# Patient Record
Sex: MALE | Race: BLACK OR AFRICAN AMERICAN | NOT HISPANIC OR LATINO | URBAN - METROPOLITAN AREA
[De-identification: names, ages, dates, MRNs, and addresses within clinical notes are randomized per-mention and may not be internally consistent; named-entity substitution may affect disease eponyms.]

---

## 2017-10-27 ENCOUNTER — OUTPATIENT (OUTPATIENT)
Dept: INPATIENT UNIT | Facility: HOSPITAL | Age: 67
LOS: 1 days | End: 2017-10-27
Payer: MEDICARE

## 2017-10-27 VITALS
DIASTOLIC BLOOD PRESSURE: 88 MMHG | HEIGHT: 70 IN | OXYGEN SATURATION: 98 % | TEMPERATURE: 100 F | HEART RATE: 83 BPM | WEIGHT: 229.94 LBS | RESPIRATION RATE: 16 BRPM | SYSTOLIC BLOOD PRESSURE: 158 MMHG

## 2017-10-27 DIAGNOSIS — Z95.5 PRESENCE OF CORONARY ANGIOPLASTY IMPLANT AND GRAFT: Chronic | ICD-10-CM

## 2017-10-27 DIAGNOSIS — I25.10 ATHEROSCLEROTIC HEART DISEASE OF NATIVE CORONARY ARTERY WITHOUT ANGINA PECTORIS: ICD-10-CM

## 2017-10-27 LAB
ALBUMIN SERPL ELPH-MCNC: 3.7 G/DL — SIGNIFICANT CHANGE UP (ref 3.3–5)
ALP SERPL-CCNC: 82 U/L — SIGNIFICANT CHANGE UP (ref 40–120)
ALT FLD-CCNC: 34 U/L RC — SIGNIFICANT CHANGE UP (ref 10–45)
ANION GAP SERPL CALC-SCNC: 12 MMOL/L — SIGNIFICANT CHANGE UP (ref 5–17)
AST SERPL-CCNC: 28 U/L — SIGNIFICANT CHANGE UP (ref 10–40)
BILIRUB SERPL-MCNC: 0.6 MG/DL — SIGNIFICANT CHANGE UP (ref 0.2–1.2)
BUN SERPL-MCNC: 13 MG/DL — SIGNIFICANT CHANGE UP (ref 7–23)
CALCIUM SERPL-MCNC: 8.8 MG/DL — SIGNIFICANT CHANGE UP (ref 8.4–10.5)
CHLORIDE SERPL-SCNC: 103 MMOL/L — SIGNIFICANT CHANGE UP (ref 96–108)
CO2 SERPL-SCNC: 24 MMOL/L — SIGNIFICANT CHANGE UP (ref 22–31)
CREAT SERPL-MCNC: 1.03 MG/DL — SIGNIFICANT CHANGE UP (ref 0.5–1.3)
GLUCOSE SERPL-MCNC: 103 MG/DL — HIGH (ref 70–99)
HCT VFR BLD CALC: 36.8 % — LOW (ref 39–50)
HGB BLD-MCNC: 12.6 G/DL — LOW (ref 13–17)
MCHC RBC-ENTMCNC: 27 PG — SIGNIFICANT CHANGE UP (ref 27–34)
MCHC RBC-ENTMCNC: 34.3 GM/DL — SIGNIFICANT CHANGE UP (ref 32–36)
MCV RBC AUTO: 78.7 FL — LOW (ref 80–100)
PLATELET # BLD AUTO: 218 K/UL — SIGNIFICANT CHANGE UP (ref 150–400)
POTASSIUM SERPL-MCNC: 4.1 MMOL/L — SIGNIFICANT CHANGE UP (ref 3.5–5.3)
POTASSIUM SERPL-SCNC: 4.1 MMOL/L — SIGNIFICANT CHANGE UP (ref 3.5–5.3)
PROT SERPL-MCNC: 7 G/DL — SIGNIFICANT CHANGE UP (ref 6–8.3)
RBC # BLD: 4.68 M/UL — SIGNIFICANT CHANGE UP (ref 4.2–5.8)
RBC # FLD: 15.4 % — HIGH (ref 10.3–14.5)
SODIUM SERPL-SCNC: 139 MMOL/L — SIGNIFICANT CHANGE UP (ref 135–145)
WBC # BLD: 12.1 K/UL — HIGH (ref 3.8–10.5)
WBC # FLD AUTO: 12.1 K/UL — HIGH (ref 3.8–10.5)

## 2017-10-27 PROCEDURE — 93010 ELECTROCARDIOGRAM REPORT: CPT

## 2017-10-27 RX ORDER — ASPIRIN/CALCIUM CARB/MAGNESIUM 324 MG
81 TABLET ORAL DAILY
Qty: 0 | Refills: 0 | Status: DISCONTINUED | OUTPATIENT
Start: 2017-10-28 | End: 2017-10-28

## 2017-10-27 RX ORDER — ISOSORBIDE MONONITRATE 60 MG/1
60 TABLET, EXTENDED RELEASE ORAL DAILY
Qty: 0 | Refills: 0 | Status: DISCONTINUED | OUTPATIENT
Start: 2017-10-27 | End: 2017-10-28

## 2017-10-27 RX ORDER — METOPROLOL TARTRATE 50 MG
100 TABLET ORAL DAILY
Qty: 0 | Refills: 0 | Status: DISCONTINUED | OUTPATIENT
Start: 2017-10-27 | End: 2017-10-28

## 2017-10-27 RX ORDER — ATORVASTATIN CALCIUM 80 MG/1
80 TABLET, FILM COATED ORAL AT BEDTIME
Qty: 0 | Refills: 0 | Status: DISCONTINUED | OUTPATIENT
Start: 2017-10-27 | End: 2017-10-28

## 2017-10-27 RX ORDER — LISINOPRIL 2.5 MG/1
20 TABLET ORAL DAILY
Qty: 0 | Refills: 0 | Status: DISCONTINUED | OUTPATIENT
Start: 2017-10-27 | End: 2017-10-28

## 2017-10-27 RX ORDER — CLOPIDOGREL BISULFATE 75 MG/1
75 TABLET, FILM COATED ORAL DAILY
Qty: 0 | Refills: 0 | Status: DISCONTINUED | OUTPATIENT
Start: 2017-10-28 | End: 2017-10-28

## 2017-10-27 RX ORDER — MONTELUKAST 4 MG/1
10 TABLET, CHEWABLE ORAL DAILY
Qty: 0 | Refills: 0 | Status: DISCONTINUED | OUTPATIENT
Start: 2017-10-27 | End: 2017-10-28

## 2017-10-27 RX ORDER — PANTOPRAZOLE SODIUM 20 MG/1
40 TABLET, DELAYED RELEASE ORAL
Qty: 0 | Refills: 0 | Status: DISCONTINUED | OUTPATIENT
Start: 2017-10-27 | End: 2017-10-28

## 2017-10-27 RX ADMIN — ATORVASTATIN CALCIUM 80 MILLIGRAM(S): 80 TABLET, FILM COATED ORAL at 21:58

## 2017-10-27 RX ADMIN — MONTELUKAST 10 MILLIGRAM(S): 4 TABLET, CHEWABLE ORAL at 22:55

## 2017-10-28 VITALS
SYSTOLIC BLOOD PRESSURE: 160 MMHG | OXYGEN SATURATION: 98 % | TEMPERATURE: 98 F | RESPIRATION RATE: 17 BRPM | HEART RATE: 79 BPM | DIASTOLIC BLOOD PRESSURE: 79 MMHG

## 2017-10-28 LAB
ANION GAP SERPL CALC-SCNC: 14 MMOL/L — SIGNIFICANT CHANGE UP (ref 5–17)
BASOPHILS # BLD AUTO: 0 K/UL — SIGNIFICANT CHANGE UP (ref 0–0.2)
BASOPHILS NFR BLD AUTO: 0.2 % — SIGNIFICANT CHANGE UP (ref 0–2)
BUN SERPL-MCNC: 15 MG/DL — SIGNIFICANT CHANGE UP (ref 7–23)
CALCIUM SERPL-MCNC: 9 MG/DL — SIGNIFICANT CHANGE UP (ref 8.4–10.5)
CHLORIDE SERPL-SCNC: 101 MMOL/L — SIGNIFICANT CHANGE UP (ref 96–108)
CO2 SERPL-SCNC: 25 MMOL/L — SIGNIFICANT CHANGE UP (ref 22–31)
CREAT SERPL-MCNC: 1.34 MG/DL — HIGH (ref 0.5–1.3)
EOSINOPHIL # BLD AUTO: 0.4 K/UL — SIGNIFICANT CHANGE UP (ref 0–0.5)
EOSINOPHIL NFR BLD AUTO: 2.9 % — SIGNIFICANT CHANGE UP (ref 0–6)
GLUCOSE SERPL-MCNC: 120 MG/DL — HIGH (ref 70–99)
HCT VFR BLD CALC: 36.2 % — LOW (ref 39–50)
HGB BLD-MCNC: 12.7 G/DL — LOW (ref 13–17)
LYMPHOCYTES # BLD AUTO: 1.7 K/UL — SIGNIFICANT CHANGE UP (ref 1–3.3)
LYMPHOCYTES # BLD AUTO: 13.8 % — SIGNIFICANT CHANGE UP (ref 13–44)
MCHC RBC-ENTMCNC: 27.4 PG — SIGNIFICANT CHANGE UP (ref 27–34)
MCHC RBC-ENTMCNC: 35 GM/DL — SIGNIFICANT CHANGE UP (ref 32–36)
MCV RBC AUTO: 78.2 FL — LOW (ref 80–100)
MONOCYTES # BLD AUTO: 1.3 K/UL — HIGH (ref 0–0.9)
MONOCYTES NFR BLD AUTO: 10.2 % — SIGNIFICANT CHANGE UP (ref 2–14)
NEUTROPHILS # BLD AUTO: 9 K/UL — HIGH (ref 1.8–7.4)
NEUTROPHILS NFR BLD AUTO: 72.8 % — SIGNIFICANT CHANGE UP (ref 43–77)
PLATELET # BLD AUTO: 223 K/UL — SIGNIFICANT CHANGE UP (ref 150–400)
POTASSIUM SERPL-MCNC: 3.8 MMOL/L — SIGNIFICANT CHANGE UP (ref 3.5–5.3)
POTASSIUM SERPL-SCNC: 3.8 MMOL/L — SIGNIFICANT CHANGE UP (ref 3.5–5.3)
RBC # BLD: 4.62 M/UL — SIGNIFICANT CHANGE UP (ref 4.2–5.8)
RBC # FLD: 15.6 % — HIGH (ref 10.3–14.5)
SODIUM SERPL-SCNC: 140 MMOL/L — SIGNIFICANT CHANGE UP (ref 135–145)
WBC # BLD: 12.3 K/UL — HIGH (ref 3.8–10.5)
WBC # FLD AUTO: 12.3 K/UL — HIGH (ref 3.8–10.5)

## 2017-10-28 PROCEDURE — 93005 ELECTROCARDIOGRAM TRACING: CPT

## 2017-10-28 PROCEDURE — 93010 ELECTROCARDIOGRAM REPORT: CPT

## 2017-10-28 PROCEDURE — 80048 BASIC METABOLIC PNL TOTAL CA: CPT

## 2017-10-28 PROCEDURE — C1769: CPT

## 2017-10-28 PROCEDURE — C9600: CPT | Mod: LD

## 2017-10-28 PROCEDURE — 80053 COMPREHEN METABOLIC PANEL: CPT

## 2017-10-28 PROCEDURE — 99153 MOD SED SAME PHYS/QHP EA: CPT

## 2017-10-28 PROCEDURE — C1894: CPT

## 2017-10-28 PROCEDURE — 99152 MOD SED SAME PHYS/QHP 5/>YRS: CPT

## 2017-10-28 PROCEDURE — C1725: CPT

## 2017-10-28 PROCEDURE — 85027 COMPLETE CBC AUTOMATED: CPT

## 2017-10-28 PROCEDURE — C1887: CPT

## 2017-10-28 PROCEDURE — C1874: CPT

## 2017-10-28 RX ORDER — PANTOPRAZOLE SODIUM 20 MG/1
1 TABLET, DELAYED RELEASE ORAL
Qty: 0 | Refills: 0 | COMMUNITY
Start: 2017-10-28

## 2017-10-28 RX ORDER — ASPIRIN/CALCIUM CARB/MAGNESIUM 324 MG
1 TABLET ORAL
Qty: 30 | Refills: 11 | OUTPATIENT
Start: 2017-10-28 | End: 2018-10-22

## 2017-10-28 RX ORDER — CLOPIDOGREL BISULFATE 75 MG/1
1 TABLET, FILM COATED ORAL
Qty: 30 | Refills: 11 | OUTPATIENT
Start: 2017-10-28 | End: 2018-10-22

## 2017-10-28 RX ADMIN — PANTOPRAZOLE SODIUM 40 MILLIGRAM(S): 20 TABLET, DELAYED RELEASE ORAL at 06:18

## 2017-10-28 RX ADMIN — LISINOPRIL 20 MILLIGRAM(S): 2.5 TABLET ORAL at 06:19

## 2017-10-28 RX ADMIN — Medication 81 MILLIGRAM(S): at 06:19

## 2017-10-28 RX ADMIN — Medication 100 MILLIGRAM(S): at 06:19

## 2017-10-28 RX ADMIN — CLOPIDOGREL BISULFATE 75 MILLIGRAM(S): 75 TABLET, FILM COATED ORAL at 06:18

## 2017-10-28 NOTE — DISCHARGE NOTE ADULT - MEDICATION SUMMARY - MEDICATIONS TO TAKE
I will START or STAY ON the medications listed below when I get home from the hospital:    Ecotrin Adult Low Strength 81 mg oral delayed release tablet  -- 1 tab(s) by mouth once a day, home/ hosp  -- Indication: For To keep stent patent     lisinopril 20 mg oral tablet  -- 1 tab(s) by mouth once a day, home  -- Indication: For Hypertension     Imdur 60 mg oral tablet, extended release  -- 1 tab(s) by mouth once a day (in the morning), home/hosp  -- Indication: For Hypertension     isosorbide mononitrate 60 mg oral tablet, extended release  -- 1 tab(s) by mouth once a day (in the morning), home  -- Indication: For Hypertension     pravastatin 80 mg oral tablet  -- 1 tab(s) by mouth once a day, home  -- Indication: For Hypertension     clopidogrel 75 mg oral tablet  -- 1 tab(s) by mouth once a day  -- Indication: For To keep stent patent     Toprol- mg oral tablet, extended release  -- 1 tab(s) by mouth once a day, home/ hosp  -- Indication: For Hypertension     montelukast 10 mg oral tablet  -- 1 tab(s) by mouth once a day, home  -- Indication: For Asthma     pantoprazole 40 mg oral delayed release tablet  -- 1 tab(s) by mouth once a day (before a meal)  -- Indication: For GERD I will START or STAY ON the medications listed below when I get home from the hospital:    Ecotrin Adult Low Strength 81 mg oral delayed release tablet  -- 1 tab(s) by mouth once a day, home/ hosp  -- Indication: For To keep stent patent     lisinopril 20 mg oral tablet  -- 1 tab(s) by mouth once a day, home  -- Indication: For Hypertension     Imdur 60 mg oral tablet, extended release  -- 1 tab(s) by mouth once a day (in the morning), home/hosp  -- Indication: For Hypertension     isosorbide mononitrate 60 mg oral tablet, extended release  -- 1 tab(s) by mouth once a day (in the morning), home  -- Indication: For Hypertension     pravastatin 80 mg oral tablet  -- 1 tab(s) by mouth once a day, home  -- Indication: For Hypertension     clopidogrel 75 mg oral tablet  -- 1 tab(s) by mouth once a day  -- Indication: For To keep stent paatent     Toprol- mg oral tablet, extended release  -- 1 tab(s) by mouth once a day, home/ hosp  -- Indication: For Hypertension     montelukast 10 mg oral tablet  -- 1 tab(s) by mouth once a day, home  -- Indication: For Asthma     pantoprazole 40 mg oral delayed release tablet  -- 1 tab(s) by mouth once a day (before a meal)  -- Indication: For GERD

## 2017-10-28 NOTE — DISCHARGE NOTE ADULT - CARE PLAN
Principal Discharge DX:	CAD (coronary artery disease)  Goal:	Pt remains chest pain free and understands post cath discharge instructions  Instructions for follow-up, activity and diet:	No heavy lifting or pushing/pulling with procedure arm for 2 weeks. No driving for 2 days. You may shower 24 hours following the procedure but avoid baths/swimming for 1 week. Check your wrist site for bleeding and/or swelling daily following procedure and call your doctor immediately if it occurs or if you experience increased pain at the site. Follow up with your cardiologist in 1-2 weeks. You may call [facility] if you have any questions/concerns regarding your procedure [phone].  Secondary Diagnosis:	HLD (hyperlipidemia)  Goal:	Your LDL cholesterol will be less than 70mg/dL  Instructions for follow-up, activity and diet:	Continue with your cholesterol medications. Eat a heart healthy diet that is low in saturated fats and salt, and includes whole grains, fruits, vegetables and lean protein; exercise regularly (consult with your physician or cardiologist first); maintain a heart healthy weight. Continue to follow with your primary physician or cardiologist for treatment goals, continue medication, have liver function testing every 3 months as anti lipid medications can cause liver irritation. If you smoke - quit (A resource to help you stop smoking is the [facility] for Tobacco Control – phone number [phone].).  Secondary Diagnosis:	HTN (hypertension)  Goal:	Your blood pressure will be controlled.  Instructions for follow-up, activity and diet:	Continue with your blood pressure medications; eat a heart healthy diet with low salt diet; exercise regularly (consult with your physician or cardiologist first); maintain a heart healthy weight; if you smoke - quit (A resource to help you stop smoking is the [facility] for Tobacco Control – phone number [phone].); include healthy ways to manage stress. Continue to follow with your primary care physician or cardiologist.

## 2017-10-28 NOTE — DISCHARGE NOTE ADULT - HOSPITAL COURSE
66 y/o male with PMHx of CAD, s/p stents in 2007, HTN, HLD presented to [facility] today with c/o chest pressure and had coronary angiogram done today ( 10/27) Patient states he has on and off chest pain for over a month with exertion which relives at rest. Patient was referred for angiogram  and was done by Dr. Martinez,  which reveals  90% stenosis in LAD.  Patient is transferred to [facility] for PCI to LAD. Pt is now s/p cardiac cath MARITZA x 1 LAD via right radial. Pt tolerated the procedure well. Cardiac cath site benign. Post-procedure discharge instructions discussed and questions addressed.

## 2017-10-28 NOTE — PROGRESS NOTE ADULT - SUBJECTIVE AND OBJECTIVE BOX
67y old  Male who presents with a chief complaint of Chest pain (28 Oct 2017 05:45) now s/p cardiac cath MARITZA x 1 LAD       Allergies    Honey (Vomiting)  No Known Drug Allergies    Intolerances      Medications:  aspirin enteric coated 81 milliGRAM(s) Oral daily  atorvastatin 80 milliGRAM(s) Oral at bedtime  clopidogrel Tablet 75 milliGRAM(s) Oral daily  isosorbide   mononitrate ER Tablet (IMDUR) 60 milliGRAM(s) Oral daily  lisinopril 20 milliGRAM(s) Oral daily  metoprolol succinate  milliGRAM(s) Oral daily  montelukast 10 milliGRAM(s) Oral daily  pantoprazole    Tablet 40 milliGRAM(s) Oral before breakfast      Vitals:  T(C): 36.4 (10-27-17 @ 17:30), Max: 37.6 (10-27-17 @ 12:08)  HR: 81 (10-27-17 @ 18:30) (74 - 83)  BP: 172/91 (10-27-17 @ 18:30) (158/88 - 172/91)  BP(mean): 111 (10-27-17 @ 12:08) (111 - 111)  RR: 16 (10-27-17 @ 18:30) (16 - 16)  SpO2: 98% (10-27-17 @ 18:30) (98% - 98%)  Wt(kg): --  Daily Height in cm: 177.8 (27 Oct 2017 17:30)    Daily Weight in k.3 (27 Oct 2017 17:30)  I&O's Summary    27 Oct 2017 07:01  -  28 Oct 2017 06:22  --------------------------------------------------------  IN: 480 mL / OUT: 400 mL / NET: 80 mL          Physical Exam:  Appearance: Normal  Eyes: PERRL, EOMI  Cardiovascular: S1S2, RRR, No M/R/G, no JVD, No Lower extremity edema  Procedural Access Site: Right radial. No hematoma, Non-tender to palpation, 2+ pulse, No bruit, No Ecchymosis  Respiratory: Clear to auscultation bilaterally  Gastrointestinal: Soft, Non tender, Normal Bowel Sounds  Musculoskeletal: No clubbing, No joint deformity   Neurologic: Non-focal  Psychiatry: AAOx3, Mood & affect appropriate  Skin: No rashes, No ecchymoses, No cyanosis    10-28    140  |  101  |  15  ----------------------------<  120<H>  3.8   |  25  |  1.34<H>    Ca    9.0      28 Oct 2017 01:23    TPro  7.0  /  Alb  3.7  /  TBili  0.6  /  DBili  x   /  AST  28  /  ALT  34  /  AlkPhos  82  10-27    ECG: NSR 81bpm   Cath:< from: Cardiac Cath Lab - Adult (10.27.17 @ 13:09) >  [facility]  Department of Cardiology  [address] [place] [address]  [phone]  Cath Lab Report -- Comprehensive Report  Patient: JASON CHEN  Study date: 10/27/2017  Account number: [id number]  MR number: [id number]  : 1950  Gender: Male  Race: Unknown  Case Physician(s):  Randall Martinez MD  Fellow:  Jc Lane M.D.  Referring Physician:  Hermann Moran MD  INDICATIONS: Unstable angina - CCS3.  HISTORY: 66 yo M with PMH of HTN, CAD s/p PCI in  electively cath at  [facility] for exertional chest pain on optimal anti-anginal therapy.  Cath at [facility] revealed 95% mid RCA, 80% OM and 30% instent restenosis of the  RCA stents. Normal EF.  Transferred for elective PCI of the LAD Known timing ofprior myocardial  infarction was eight or more days prior to admission. The patient has  hypertension and medication-treated dyslipidemia.  PROCEDURE:  --  Intervention on mid LAD: drug-eluting stent.  TECHNIQUE: The patient was brought to the cath lab and placed on the table.  The planned puncture sites were prepped and draped in the usual sterile  fashion. The risks and alternatives of the procedures and conscious  sedation were explained to the patient and informed consent was obtained.  Cardiac catheterization performed electively. Coronary intervention  performed electively.  Local anesthetic given. Right radial artery access. Right radial artery  access. A 6FR PRELUDE KIT was inserted in the vessel. RADIATION EXPOSURE:  20.4 min. A drug-eluting stent was performed on the 90 % lesion in the mid  LAD. Following intervention there was a 1 % residual stenosis. Vessel  setup was performed. A 6FR JL3.5 LAUNCHER guiding catheter was used to  intubate the vessel. Vessel setup was performed.A BMW UNIVERSAL 190CM  wire was used to cross the lesion. Balloon angioplasty was performed,  using a 2.5 X 15 MAVERICK RX balloon, with 4 inflations and a maximum  inflation pressure of 14 inna. Balloon angioplasty was performed, using a  3.00 X 15 MAVERICK RX balloon, with 3 inflations and a maximum inflation  pressure of 14 inna. A 4.00 X 24 SYNERGY drug-eluting stent was placed  across the lesion and deployed at a maximum inflation pressure of 11 inna.  Balloon angioplasty was performed, using a4.00 X 12 NC APEX balloon, with  4 inflations and a maximum inflation pressure of 18 inna.  CONTRAST GIVEN: Omnipaque 112 ml.  MEDICATIONS GIVEN: Fentanyl, 25 mcg, IV. Midazolam, 1 mg, IV.  Nitroglycerin, 150 mcg, intracoronary. Nitroglycerin, 150 mcg,  intracoronary. Nitroglycerin, 150 mcg, intracoronary. Heparin, 8000 units,  IV. Cardene, 500 mcg.  VENTRICLES: No left ventriculogram was performed.  CORONARY VESSELS: The coronary circulation is right dominant.  LM:   --  LM: Normal.  LAD:   --  Mid LAD: There was a 90 % stenosis.  CX:   --  OM1: There was a 80 % stenosis.  RCA:   --  RCA: This vessel was not injected, but was visualized during a  prior cardiac catheterization on 10/27/2017.  COMPLICATIONS: There were no complications.  DIAGNOSTIC RECOMMENDATIONS: Successful PCI of the mid LAD with MARITZA x1  (synergy 4.0 x 24 post dilated to 4.2).  A/P  -aspirin 81 mg daily indefinitely  -plavix 75 mg daily for at least one year  -lipitor 80 mg daily  -aggressive BP control  -plan for PCI ofOM in 1 month  INTERVENTIONAL RECOMMENDATIONS: Successful PCI of the mid LAD with MARITZA x1  (synergy 4.0 x 24 post dilated to 4.2).  A/P  -aspirin 81 mg daily indefinitely  -plavix 75 mg daily for at least one year  -lipitor 80 mg daily  -aggressive BPcontrol  -plan for PCI of OM in 1 month  Prepared and signed by  Randall Martinez MD  Signed 10/27/2017 14:44:38  HEMODYNAMIC TABLES  Pressures:  Intervention  Pressures:  - HR: 70  Pressures:  - Rhythm:  Pressures:  -- Aortic Pressure (S/D/M): 176/105/135  Outputs:  Baseline  Outputs:  -- CALCULATIONS[age]  Outputs:  -- CALCULATIONS: Body Surface Area: 2.22  Outputs:  -- CALCULATIONS: Height in cm: 178.00  Outputs:  -- CALCULATIONS: Sex: Male  Outputs:  -- CALCULATIONS: Weight ink.30    < end of copied text >      Interpretation of Telemetry: SR 80s

## 2017-10-28 NOTE — DISCHARGE NOTE ADULT - PATIENT PORTAL LINK FT
“You can access the FollowHealth Patient Portal, offered by [facility], by registering with the following website: [email or web address]”

## 2017-10-28 NOTE — DISCHARGE NOTE ADULT - PLAN OF CARE
Pt remains chest pain free and understands post cath discharge instructions No heavy lifting or pushing/pulling with procedure arm for 2 weeks. No driving for 2 days. You may shower 24 hours following the procedure but avoid baths/swimming for 1 week. Check your wrist site for bleeding and/or swelling daily following procedure and call your doctor immediately if it occurs or if you experience increased pain at the site. Follow up with your cardiologist in 1-2 weeks. You may call [facility] if you have any questions/concerns regarding your procedure [phone]. Your LDL cholesterol will be less than 70mg/dL Continue with your cholesterol medications. Eat a heart healthy diet that is low in saturated fats and salt, and includes whole grains, fruits, vegetables and lean protein; exercise regularly (consult with your physician or cardiologist first); maintain a heart healthy weight. Continue to follow with your primary physician or cardiologist for treatment goals, continue medication, have liver function testing every 3 months as anti lipid medications can cause liver irritation. If you smoke - quit (A resource to help you stop smoking is the [facility] for Tobacco Control – phone number [phone].). Your blood pressure will be controlled. Continue with your blood pressure medications; eat a heart healthy diet with low salt diet; exercise regularly (consult with your physician or cardiologist first); maintain a heart healthy weight; if you smoke - quit (A resource to help you stop smoking is the [facility] for Tobacco Control – phone number [phone].); include healthy ways to manage stress. Continue to follow with your primary care physician or cardiologist.

## 2017-10-28 NOTE — DISCHARGE NOTE ADULT - ADDITIONAL INSTRUCTIONS
Follow-up with your cardiologist in 1-2 weeks.  DO NOT STOP ASPIRIN AND PLAVIX Follow-up with your cardiologist in 1-2 weeks.  DO NOT STOP ASPIRIN AND PLAVIX BEFORE SPEAKING WITH YOUR CARDIOLOGIST

## 2017-10-28 NOTE — DISCHARGE NOTE ADULT - CARE PROVIDER_API CALL
Hermann Moran), Cardiology; Internal Medicine  [address] [place] [address]  Phone: [phone]  Fax: [phone]

## 2017-10-31 RX ORDER — OMEPRAZOLE 10 MG/1
1 CAPSULE, DELAYED RELEASE ORAL
Qty: 0 | Refills: 0 | COMMUNITY

## 2017-10-31 RX ORDER — ASPIRIN/CALCIUM CARB/MAGNESIUM 324 MG
1 TABLET ORAL
Qty: 0 | Refills: 0 | COMMUNITY

## 2017-10-31 RX ORDER — LISINOPRIL 2.5 MG/1
1 TABLET ORAL
Qty: 0 | Refills: 0 | COMMUNITY

## 2017-11-29 ENCOUNTER — OUTPATIENT (OUTPATIENT)
Dept: OUTPATIENT SERVICES | Facility: HOSPITAL | Age: 67
LOS: 1 days | End: 2017-11-29
Payer: MEDICARE

## 2017-11-29 VITALS
SYSTOLIC BLOOD PRESSURE: 138 MMHG | TEMPERATURE: 98 F | DIASTOLIC BLOOD PRESSURE: 80 MMHG | HEART RATE: 78 BPM | HEIGHT: 70 IN | OXYGEN SATURATION: 99 % | RESPIRATION RATE: 16 BRPM | WEIGHT: 229.94 LBS

## 2017-11-29 DIAGNOSIS — Z95.5 PRESENCE OF CORONARY ANGIOPLASTY IMPLANT AND GRAFT: Chronic | ICD-10-CM

## 2017-11-29 DIAGNOSIS — Z95.9 PRESENCE OF CARDIAC AND VASCULAR IMPLANT AND GRAFT, UNSPECIFIED: Chronic | ICD-10-CM

## 2017-11-29 DIAGNOSIS — I25.10 ATHEROSCLEROTIC HEART DISEASE OF NATIVE CORONARY ARTERY WITHOUT ANGINA PECTORIS: ICD-10-CM

## 2017-11-29 LAB
ALBUMIN SERPL ELPH-MCNC: 4.4 G/DL — SIGNIFICANT CHANGE UP (ref 3.3–5)
ALP SERPL-CCNC: 109 U/L — SIGNIFICANT CHANGE UP (ref 40–120)
ALT FLD-CCNC: 37 U/L RC — SIGNIFICANT CHANGE UP (ref 10–45)
ANION GAP SERPL CALC-SCNC: 11 MMOL/L — SIGNIFICANT CHANGE UP (ref 5–17)
AST SERPL-CCNC: 28 U/L — SIGNIFICANT CHANGE UP (ref 10–40)
BILIRUB SERPL-MCNC: 0.6 MG/DL — SIGNIFICANT CHANGE UP (ref 0.2–1.2)
BUN SERPL-MCNC: 13 MG/DL — SIGNIFICANT CHANGE UP (ref 7–23)
CALCIUM SERPL-MCNC: 9.5 MG/DL — SIGNIFICANT CHANGE UP (ref 8.4–10.5)
CHLORIDE SERPL-SCNC: 104 MMOL/L — SIGNIFICANT CHANGE UP (ref 96–108)
CO2 SERPL-SCNC: 27 MMOL/L — SIGNIFICANT CHANGE UP (ref 22–31)
CREAT SERPL-MCNC: 1.1 MG/DL — SIGNIFICANT CHANGE UP (ref 0.5–1.3)
GLUCOSE SERPL-MCNC: 114 MG/DL — HIGH (ref 70–99)
HCT VFR BLD CALC: 38.4 % — LOW (ref 39–50)
HGB BLD-MCNC: 12.8 G/DL — LOW (ref 13–17)
MCHC RBC-ENTMCNC: 26.6 PG — LOW (ref 27–34)
MCHC RBC-ENTMCNC: 33.4 GM/DL — SIGNIFICANT CHANGE UP (ref 32–36)
MCV RBC AUTO: 79.7 FL — LOW (ref 80–100)
PLATELET # BLD AUTO: 300 K/UL — SIGNIFICANT CHANGE UP (ref 150–400)
POTASSIUM SERPL-MCNC: 4.2 MMOL/L — SIGNIFICANT CHANGE UP (ref 3.5–5.3)
POTASSIUM SERPL-SCNC: 4.2 MMOL/L — SIGNIFICANT CHANGE UP (ref 3.5–5.3)
PROT SERPL-MCNC: 8.1 G/DL — SIGNIFICANT CHANGE UP (ref 6–8.3)
RBC # BLD: 4.82 M/UL — SIGNIFICANT CHANGE UP (ref 4.2–5.8)
RBC # FLD: 15.6 % — HIGH (ref 10.3–14.5)
SODIUM SERPL-SCNC: 142 MMOL/L — SIGNIFICANT CHANGE UP (ref 135–145)
WBC # BLD: 11.4 K/UL — HIGH (ref 3.8–10.5)
WBC # FLD AUTO: 11.4 K/UL — HIGH (ref 3.8–10.5)

## 2017-11-29 PROCEDURE — 93010 ELECTROCARDIOGRAM REPORT: CPT | Mod: 76

## 2017-11-29 RX ORDER — METOPROLOL TARTRATE 50 MG
100 TABLET ORAL DAILY
Qty: 0 | Refills: 0 | Status: DISCONTINUED | OUTPATIENT
Start: 2017-11-29 | End: 2017-11-30

## 2017-11-29 RX ORDER — PREGABALIN 225 MG/1
1 CAPSULE ORAL
Qty: 0 | Refills: 0 | COMMUNITY
Start: 2017-11-29

## 2017-11-29 RX ORDER — NICOTINE POLACRILEX 2 MG
1 GUM BUCCAL
Qty: 1 | Refills: 2 | OUTPATIENT
Start: 2017-11-29 | End: 2018-02-26

## 2017-11-29 RX ORDER — FOLIC ACID 0.8 MG
1 TABLET ORAL DAILY
Qty: 0 | Refills: 0 | Status: DISCONTINUED | OUTPATIENT
Start: 2017-11-29 | End: 2017-11-30

## 2017-11-29 RX ORDER — THIAMINE MONONITRATE (VIT B1) 100 MG
100 TABLET ORAL DAILY
Qty: 0 | Refills: 0 | Status: DISCONTINUED | OUTPATIENT
Start: 2017-11-29 | End: 2017-11-30

## 2017-11-29 RX ORDER — ISOSORBIDE MONONITRATE 60 MG/1
1 TABLET, EXTENDED RELEASE ORAL
Qty: 0 | Refills: 0 | COMMUNITY

## 2017-11-29 RX ORDER — PANTOPRAZOLE SODIUM 20 MG/1
40 TABLET, DELAYED RELEASE ORAL
Qty: 0 | Refills: 0 | Status: DISCONTINUED | OUTPATIENT
Start: 2017-11-29 | End: 2017-11-30

## 2017-11-29 RX ORDER — SODIUM CHLORIDE 9 MG/ML
1000 INJECTION INTRAMUSCULAR; INTRAVENOUS; SUBCUTANEOUS
Qty: 0 | Refills: 0 | Status: DISCONTINUED | OUTPATIENT
Start: 2017-11-29 | End: 2017-11-30

## 2017-11-29 RX ORDER — ATORVASTATIN CALCIUM 80 MG/1
40 TABLET, FILM COATED ORAL AT BEDTIME
Qty: 0 | Refills: 0 | Status: DISCONTINUED | OUTPATIENT
Start: 2017-11-29 | End: 2017-11-30

## 2017-11-29 RX ORDER — ISOSORBIDE MONONITRATE 60 MG/1
60 TABLET, EXTENDED RELEASE ORAL DAILY
Qty: 0 | Refills: 0 | Status: DISCONTINUED | OUTPATIENT
Start: 2017-11-29 | End: 2017-11-30

## 2017-11-29 RX ORDER — ASPIRIN/CALCIUM CARB/MAGNESIUM 324 MG
81 TABLET ORAL DAILY
Qty: 0 | Refills: 0 | Status: DISCONTINUED | OUTPATIENT
Start: 2017-11-29 | End: 2017-11-30

## 2017-11-29 RX ORDER — MONTELUKAST 4 MG/1
10 TABLET, CHEWABLE ORAL DAILY
Qty: 0 | Refills: 0 | Status: DISCONTINUED | OUTPATIENT
Start: 2017-11-29 | End: 2017-11-30

## 2017-11-29 RX ORDER — FOLIC ACID 0.8 MG
1 TABLET ORAL
Qty: 0 | Refills: 0 | COMMUNITY
Start: 2017-11-29

## 2017-11-29 RX ORDER — NICOTINE POLACRILEX 2 MG
1 GUM BUCCAL DAILY
Qty: 0 | Refills: 0 | Status: DISCONTINUED | OUTPATIENT
Start: 2017-11-29 | End: 2017-11-30

## 2017-11-29 RX ORDER — THIAMINE MONONITRATE (VIT B1) 100 MG
1 TABLET ORAL
Qty: 0 | Refills: 0 | COMMUNITY
Start: 2017-11-29

## 2017-11-29 RX ORDER — CLOPIDOGREL BISULFATE 75 MG/1
75 TABLET, FILM COATED ORAL DAILY
Qty: 0 | Refills: 0 | Status: DISCONTINUED | OUTPATIENT
Start: 2017-11-29 | End: 2017-11-30

## 2017-11-29 RX ORDER — LISINOPRIL 2.5 MG/1
20 TABLET ORAL DAILY
Qty: 0 | Refills: 0 | Status: DISCONTINUED | OUTPATIENT
Start: 2017-11-29 | End: 2017-11-30

## 2017-11-29 RX ORDER — PREGABALIN 225 MG/1
100 CAPSULE ORAL DAILY
Qty: 0 | Refills: 0 | Status: DISCONTINUED | OUTPATIENT
Start: 2017-11-29 | End: 2017-11-30

## 2017-11-29 RX ADMIN — ATORVASTATIN CALCIUM 40 MILLIGRAM(S): 80 TABLET, FILM COATED ORAL at 21:11

## 2017-11-29 NOTE — DISCHARGE NOTE ADULT - PATIENT PORTAL LINK FT
“You can access the FollowHealth Patient Portal, offered by [facility], by registering with the following website: [email or web address]”

## 2017-11-29 NOTE — DISCHARGE NOTE ADULT - CARE PROVIDERS DIRECT ADDRESSES
Problem: Patient Care Overview  Goal: Plan of Care Review  No distress noted       ,DirectAddress_Unknown

## 2017-11-29 NOTE — H&P CARDIOLOGY - FAMILY HISTORY
Mother  Still living? Yes, Estimated age: Age Unknown  Family history of premature CAD, Age at diagnosis: Age Unknown

## 2017-11-29 NOTE — H&P CARDIOLOGY - PSH
S/P angioplasty with stent  10/27/17 s/p PCI/MARITZA x 1 to mLAD 90% with OM1 80% for staged PCI.  S/P drug eluting coronary stent placement

## 2017-11-29 NOTE — DISCHARGE NOTE ADULT - PLAN OF CARE
Pt remains chest pain free and understands post cath discharge instructions Low salt, low fat diet.   Weight management.   Take medications as prescribed.    No smoking.  Follow up appointments with your doctor(s)  as instruced.   No heavy lifting for 2 weeks, no strenuous activity  ( pushing/ pulling) no driving for x 2 days,  you may shower 24 hours following procedure but no bathing or swimming for x1  week, no strenuous sex for x 1 week & follow up with your cardiologist in 1-2 week Your LDL cholesterol will be less than 70mg/dL Continue with your cholesterol medications. Eat a heart healthy diet that is low in saturated fats and salt, and includes whole grains, fruits, vegetables and lean protein; exercise regularly (consult with your physician or cardiologist first); maintain a heart healthy weight; if you smoke - quit (A resource to help you stop smoking is the [facility] for Tobacco Control – phone number [phone].). Continue to follow with your primary physician or cardiologist. Your blood pressure will be controlled. Continue with your blood pressure medications; eat a heart healthy diet with low salt diet; exercise regularly (consult with your physician or cardiologist first); maintain a heart healthy weight; if you smoke - quit (A resource to help you stop smoking is the [facility] for Tobacco Control – phone number [phone].); include healthy ways to manage stress. Continue to follow with your primary care physician or cardiologist.

## 2017-11-29 NOTE — DISCHARGE NOTE ADULT - MEDICATION SUMMARY - MEDICATIONS TO CHANGE
I will SWITCH the dose or number of times a day I take the medications listed below when I get home from the hospital:  None I will SWITCH the dose or number of times a day I take the medications listed below when I get home from the hospital:    atorvastatin 40 mg oral tablet  -- 1 tab(s) by mouth once a day -  (have to check dose Pharmacy was not open)

## 2017-11-29 NOTE — DISCHARGE NOTE ADULT - FINDINGS/TREATMENT
s/p cath on 11/29 via [facility]  < from: Cardiac Cath Lab - Adult (11.29.17 @ 10:51) >  --  Intervention on OM1: drug-eluting stent.    < end of copied text >

## 2017-11-29 NOTE — H&P CARDIOLOGY - NSALCOHOLTYPE_GEN__A_CORE_SD
liquor/1 large 16 0z drink with Vodka/beer lime juice and gingerale and drinks over the course of the day

## 2017-11-29 NOTE — DISCHARGE NOTE ADULT - MEDICATION SUMMARY - MEDICATIONS TO TAKE
I will START or STAY ON the medications listed below when I get home from the hospital:    Ecotrin Adult Low Strength 81 mg oral delayed release tablet  -- 1 tab(s) by mouth once a day, home/ hosp  -- Indication: For cad- stents    lisinopril 20 mg oral tablet  -- 1 tab(s) by mouth once a day, home  -- Indication: For high blood pressure    Imdur 60 mg oral tablet, extended release  -- 1 tab(s) by mouth once a day (in the morning), home/hosp  -- Indication: For chest pain    atorvastatin 40 mg oral tablet  -- 1 tab(s) by mouth once a day -  (have to check dose Pharmacy was not open)  -- Indication: For high cholesterol    clopidogrel 75 mg oral tablet  -- 1 tab(s) by mouth once a day  -- Indication: For cad-stents    Toprol- mg oral tablet, extended release  -- 1 tab(s) by mouth once a day, home/ hosp  -- Indication: For high blood pressure    montelukast 10 mg oral tablet  -- 1 tab(s) by mouth once a day, home  -- Indication: For Asthma    omeprazole 40 mg oral delayed release capsule  -- 1 cap(s) by mouth once a day  -- Indication: For Acid reflux    Nicoderm C-Q 7 mg/24 hr transdermal film, extended release  -- 1 patch by transdermal patch once a day MDD:1  -- Indication: For Smoking cessation     cyanocobalamin 100 mcg oral tablet  -- 1 tab(s) by mouth once a day  -- Indication: For Supplement     folic acid 1 mg oral tablet  -- 1 tab(s) by mouth once a day  -- Indication: For Supplement     thiamine 100 mg oral tablet  -- 1 tab(s) by mouth once a day  -- Indication: For Supplement I will START or STAY ON the medications listed below when I get home from the hospital:    Ecotrin Adult Low Strength 81 mg oral delayed release tablet  -- 1 tab(s) by mouth once a day, home/ hosp  -- Indication: For cad- stents    lisinopril 20 mg oral tablet  -- 1 tab(s) by mouth once a day, home  -- Indication: For high blood pressure    Imdur 60 mg oral tablet, extended release  -- 1 tab(s) by mouth once a day (in the morning), home/hosp  -- Indication: For chest pain    atorvastatin 80 mg oral tablet  -- 1 tab(s) by mouth once a day  -- Indication: For HLD (hyperlipidemia)    clopidogrel 75 mg oral tablet  -- 1 tab(s) by mouth once a day  -- Indication: For cad-stents    Toprol- mg oral tablet, extended release  -- 1 tab(s) by mouth once a day, home/ hosp  -- Indication: For high blood pressure    montelukast 10 mg oral tablet  -- 1 tab(s) by mouth once a day, home  -- Indication: For Asthma    omeprazole 40 mg oral delayed release capsule  -- 1 cap(s) by mouth once a day  -- Indication: For Acid reflux    Nicoderm C-Q 7 mg/24 hr transdermal film, extended release  -- 1 patch by transdermal patch once a day MDD:1  -- Indication: For Smoking cessation     cyanocobalamin 100 mcg oral tablet  -- 1 tab(s) by mouth once a day  -- Indication: For Supplement     folic acid 1 mg oral tablet  -- 1 tab(s) by mouth once a day  -- Indication: For Supplement     thiamine 100 mg oral tablet  -- 1 tab(s) by mouth once a day  -- Indication: For Supplement I will START or STAY ON the medications listed below when I get home from the hospital:    Ecotrin Adult Low Strength 81 mg oral delayed release tablet  -- 1 tab(s) by mouth once a day, home/ hosp  -- Indication: For cad- stents    lisinopril 20 mg oral tablet  -- 1 tab(s) by mouth once a day, home  -- Indication: For high blood pressure    Imdur 60 mg oral tablet, extended release  -- 1 tab(s) by mouth once a day (in the morning), home/hosp  -- Indication: For chest pain    atorvastatin 80 mg oral tablet  -- 1 tab(s) by mouth once a day  -- Indication: For HLD (hyperlipidemia)    clopidogrel 75 mg oral tablet  -- 1 tab(s) by mouth once a day  -- Indication: For cad-stents    Toprol- mg oral tablet, extended release  -- 1 tab(s) by mouth once a day, home/ hosp  -- Indication: For high blood pressure    montelukast 10 mg oral tablet  -- 1 tab(s) by mouth once a day, home  -- Indication: For Asthma    omeprazole 40 mg oral delayed release capsule  -- 1 cap(s) by mouth once a day  -- Indication: For Acid reflux

## 2017-11-29 NOTE — DISCHARGE NOTE ADULT - NS AS ACTIVITY OBS
No Heavy lifting/straining/Walking-Outdoors allowed/Walking-Indoors allowed Showering allowed/Walking-Indoors allowed/Walking-Outdoors allowed/No Heavy lifting/straining

## 2017-11-29 NOTE — DISCHARGE NOTE ADULT - CARE PLAN
Principal Discharge DX:	CAD (coronary artery disease)  Goal:	Pt remains chest pain free and understands post cath discharge instructions  Instructions for follow-up, activity and diet:	Low salt, low fat diet.   Weight management.   Take medications as prescribed.    No smoking.  Follow up appointments with your doctor(s)  as instruced.   No heavy lifting for 2 weeks, no strenuous activity  ( pushing/ pulling) no driving for x 2 days,  you may shower 24 hours following procedure but no bathing or swimming for x1  week, no strenuous sex for x 1 week & follow up with your cardiologist in 1-2 week  Secondary Diagnosis:	HLD (hyperlipidemia)  Goal:	Your LDL cholesterol will be less than 70mg/dL  Instructions for follow-up, activity and diet:	Continue with your cholesterol medications. Eat a heart healthy diet that is low in saturated fats and salt, and includes whole grains, fruits, vegetables and lean protein; exercise regularly (consult with your physician or cardiologist first); maintain a heart healthy weight; if you smoke - quit (A resource to help you stop smoking is the [facility] [employer] Control – phone number [phone].). Continue to follow with your primary physician or cardiologist.  Secondary Diagnosis:	HTN (hypertension)  Goal:	Your blood pressure will be controlled.  Instructions for follow-up, activity and diet:	Continue with your blood pressure medications; eat a heart healthy diet with low salt diet; exercise regularly (consult with your physician or cardiologist first); maintain a heart healthy weight; if you smoke - quit (A resource to help you stop smoking is the [facility] for [employer] Control – phone number [phone].); include healthy ways to manage stress. Continue to follow with your primary care physician or cardiologist.

## 2017-11-29 NOTE — DISCHARGE NOTE ADULT - CARE PROVIDER_API CALL
Randall Martinez), Cardiology; Internal Medicine  [address] [place] [address]  Phone: [phone]  Fax: [phone]

## 2017-11-29 NOTE — DISCHARGE NOTE ADULT - HOSPITAL COURSE
66 y/o obese male former occasional smoker, ETOH dependence (drinks 1 large mixed drink a day last drink 11/28) with pmh of HTN, HLD, CAD with prior stents in 2007 recently tx from [facility] for reports of intermittent chest pain for 1month with exertion relieved at rest s/p PCI/MARITZA to mLAD 90% x 1 followed up with Dr. Martinez, Cardiologist and presents for staged PCI of OM1 80%.  Pt denies cp, sob or palpitations presently and since his PCI on 10/27/17.    < from: Cardiac Cath Lab - Adult (11.29.17 @ 10:51) >  LM:   --  LM: Normal.  LAD:   --  LAD: Angiography showed patent stent and mild atherosclerosis  with no flow limiting lesions distally  CX:   --  OM1: There was a 80 % stenosis.  RCA:   --  RCA: This vessel was not injected.  COMPLICATIONS: There were no complications.  DIAGNOSTIC RECOMMENDATIONS: Successfull PCI of 80% OM 1 stenosis with MARITZA  x1 (Synergy 3.5 x 28 post dilated to 4.0)  A/P  -continue with current medications including aspirin, plavix and statin  -close outpatient follow up  INTERVENTIONAL RECOMMENDATIONS: Successfull PCI of 80% OM 1 stenosis with  MARITZA x1 (Synergy 3.5 x 28 post dilated to 4.0)  A/P  -continue with current medications including aspirin, plavix and statin  -close outpatient follow up  Prepared and signed by  Randall Martinez MD  Signed 11/29/2017 16:15:47    < end of copied text > 68 y/o obese male former occasional smoker, ETOH dependence (drinks 1 large mixed drink a day last drink 11/28) with pmh of HTN, HLD, CAD with prior stents in 2007 recently tx from [facility] for reports of intermittent chest pain for 1month with exertion relieved at rest s/p PCI/MARITZA to mLAD 90% x 1 followed up with Dr. Martinez, Cardiologist and presents for staged PCI of OM1 80%.  Pt denies cp, sob or palpitations presently and since his PCI on 10/27/17.  Pt s/p PCI via R radial. < from: Cardiac Cath Lab - Adult (11.29.17 @ 10:51) >  LM:   --  LM: Normal.  LAD:   --  LAD: Angiography showed patent stent and mild atherosclerosis  with no flow limiting lesions distally  CX:   --  OM1: There was a 80 % stenosis.  RCA:   --  RCA: This vessel was not injected.  COMPLICATIONS: There were no complications.  DIAGNOSTIC RECOMMENDATIONS: Successfull PCI of 80% OM 1 stenosis with MARITZA  x1 (Synergy 3.5 x 28 post dilated to 4.0)  A/P  -continue with current medications including aspirin, plavix and statin  -close outpatient follow up  INTERVENTIONAL RECOMMENDATIONS: Successfull PCI of 80% OM 1 stenosis with  MARITZA x1 (Synergy 3.5 x 28 post dilated to 4.0)  A/P  -continue with current medications including aspirin, plavix and statin  -close outpatient follow up  Prepared and signed by  Randall Martinez MD  Signed 11/29/2017 16:15:47  Pt tolerated procedure well and overnight remained uneventful. No c/o chest pain or SOB. Pt is hemodynamically stable, EKG and all lab results reviewed. Insertion/incision site benign, no bleeding or hematoma, and cath site dressing changed. Discharge teaching provided to Pt/caretaker and verbalized understanding the instruction. Pt is stable for discharge home as per attending.

## 2017-11-29 NOTE — H&P CARDIOLOGY - HISTORY OF PRESENT ILLNESS
66 y/o obese male former occasional smoker, ETOH dependence (drinks 1 large mixed drink a day last drink 11/28) with pmh of HTN, HLD, CAD with prior stents in 2007 recently tx from [facility] for reports of intermittent chest pain for 1month with exertion relieved at rest s/p PCI/MARITZA to mLAD 90% x 1 followed up with Dr. Martinez, Cardiologist and presents for staged PCI of OM1 80%.  Pt denies cp, sob or palpitations presently and since his PCI on 10/27/17.   Pt with elevation in creatinine post procedure will hydrate with NS @75cc/hr pre cath.      < from: Cardiac Cath Lab - Adult (10.27.17 @ 13:09) >  CORONARY VESSELS: The coronary circulation is right dominant.  LM:   --  LM: Normal.  LAD:   --  Mid LAD: There was a 90 % stenosis.  CX:   --  OM1: There was a 80 % stenosis.  RCA:   --  RCA: This vessel was not injected, but was visualized during a  prior cardiac catheterization on 10/27/2017.  COMPLICATIONS: There were no complications.  DIAGNOSTIC RECOMMENDATIONS: Successful PCI of the mid LAD with MARITZA x1  (synergy 4.0 x 24 post dilated to 4.2).  A/P  -aspirin 81 mg daily indefinitely  -plavix 75 mg daily for at least one year  -lipitor 80 mg daily  -aggressive BP control  -plan for PCI ofOM in 1 month  INTERVENTIONAL RECOMMENDATIONS: Successful PCI of the mid LAD with MARITZA x1  (synergy 4.0 x 24 post dilated to 4.2).  A/P  -aspirin 81 mg daily indefinitely  -plavix 75 mg daily for at least one year  -lipitor 80 mg daily  -aggressive BPcontrol  -plan for PCI of OM in 1 month  Prepared and signed by  Randall Martinez MD    < end of copied text >

## 2017-11-30 VITALS
OXYGEN SATURATION: 100 % | DIASTOLIC BLOOD PRESSURE: 79 MMHG | RESPIRATION RATE: 17 BRPM | SYSTOLIC BLOOD PRESSURE: 155 MMHG | TEMPERATURE: 98 F | HEART RATE: 80 BPM

## 2017-11-30 LAB
ANION GAP SERPL CALC-SCNC: 13 MMOL/L — SIGNIFICANT CHANGE UP (ref 5–17)
BUN SERPL-MCNC: 16 MG/DL — SIGNIFICANT CHANGE UP (ref 7–23)
CALCIUM SERPL-MCNC: 9.1 MG/DL — SIGNIFICANT CHANGE UP (ref 8.4–10.5)
CHLORIDE SERPL-SCNC: 103 MMOL/L — SIGNIFICANT CHANGE UP (ref 96–108)
CO2 SERPL-SCNC: 25 MMOL/L — SIGNIFICANT CHANGE UP (ref 22–31)
CREAT SERPL-MCNC: 1.15 MG/DL — SIGNIFICANT CHANGE UP (ref 0.5–1.3)
GLUCOSE SERPL-MCNC: 196 MG/DL — HIGH (ref 70–99)
HCT VFR BLD CALC: 36.5 % — LOW (ref 39–50)
HGB BLD-MCNC: 11.5 G/DL — LOW (ref 13–17)
MCHC RBC-ENTMCNC: 25.1 PG — LOW (ref 27–34)
MCHC RBC-ENTMCNC: 31.4 GM/DL — LOW (ref 32–36)
MCV RBC AUTO: 79.8 FL — LOW (ref 80–100)
PLATELET # BLD AUTO: 271 K/UL — SIGNIFICANT CHANGE UP (ref 150–400)
POTASSIUM SERPL-MCNC: 4.1 MMOL/L — SIGNIFICANT CHANGE UP (ref 3.5–5.3)
POTASSIUM SERPL-SCNC: 4.1 MMOL/L — SIGNIFICANT CHANGE UP (ref 3.5–5.3)
RBC # BLD: 4.58 M/UL — SIGNIFICANT CHANGE UP (ref 4.2–5.8)
RBC # FLD: 15.6 % — HIGH (ref 10.3–14.5)
SODIUM SERPL-SCNC: 141 MMOL/L — SIGNIFICANT CHANGE UP (ref 135–145)
WBC # BLD: 11.1 K/UL — HIGH (ref 3.8–10.5)
WBC # FLD AUTO: 11.1 K/UL — HIGH (ref 3.8–10.5)

## 2017-11-30 PROCEDURE — 99153 MOD SED SAME PHYS/QHP EA: CPT

## 2017-11-30 PROCEDURE — 80048 BASIC METABOLIC PNL TOTAL CA: CPT

## 2017-11-30 PROCEDURE — C1874: CPT

## 2017-11-30 PROCEDURE — 85027 COMPLETE CBC AUTOMATED: CPT

## 2017-11-30 PROCEDURE — C9600: CPT | Mod: LC

## 2017-11-30 PROCEDURE — 93005 ELECTROCARDIOGRAM TRACING: CPT

## 2017-11-30 PROCEDURE — 93010 ELECTROCARDIOGRAM REPORT: CPT

## 2017-11-30 PROCEDURE — 99152 MOD SED SAME PHYS/QHP 5/>YRS: CPT

## 2017-11-30 PROCEDURE — C1887: CPT

## 2017-11-30 PROCEDURE — 80053 COMPREHEN METABOLIC PANEL: CPT

## 2017-11-30 PROCEDURE — C1725: CPT

## 2017-11-30 PROCEDURE — C1769: CPT

## 2017-11-30 RX ORDER — MONTELUKAST 4 MG/1
1 TABLET, CHEWABLE ORAL
Qty: 0 | Refills: 0 | COMMUNITY

## 2017-11-30 RX ORDER — METOPROLOL TARTRATE 50 MG
1 TABLET ORAL
Qty: 0 | Refills: 0 | COMMUNITY

## 2017-11-30 RX ORDER — ISOSORBIDE MONONITRATE 60 MG/1
1 TABLET, EXTENDED RELEASE ORAL
Qty: 0 | Refills: 0 | COMMUNITY

## 2017-11-30 RX ORDER — LISINOPRIL 2.5 MG/1
1 TABLET ORAL
Qty: 0 | Refills: 0 | COMMUNITY

## 2017-11-30 RX ADMIN — Medication 100 MILLIGRAM(S): at 05:29

## 2017-11-30 RX ADMIN — LISINOPRIL 20 MILLIGRAM(S): 2.5 TABLET ORAL at 05:29

## 2017-11-30 RX ADMIN — ISOSORBIDE MONONITRATE 60 MILLIGRAM(S): 60 TABLET, EXTENDED RELEASE ORAL at 11:22

## 2017-11-30 RX ADMIN — MONTELUKAST 10 MILLIGRAM(S): 4 TABLET, CHEWABLE ORAL at 11:22

## 2017-11-30 RX ADMIN — Medication 100 MILLIGRAM(S): at 11:22

## 2017-11-30 RX ADMIN — Medication 1 MILLIGRAM(S): at 11:23

## 2017-11-30 RX ADMIN — PREGABALIN 100 MICROGRAM(S): 225 CAPSULE ORAL at 11:22

## 2017-11-30 RX ADMIN — CLOPIDOGREL BISULFATE 75 MILLIGRAM(S): 75 TABLET, FILM COATED ORAL at 05:29

## 2017-11-30 RX ADMIN — Medication 81 MILLIGRAM(S): at 05:29

## 2017-11-30 RX ADMIN — PANTOPRAZOLE SODIUM 40 MILLIGRAM(S): 20 TABLET, DELAYED RELEASE ORAL at 05:29

## 2017-11-30 NOTE — PROGRESS NOTE ADULT - SUBJECTIVE AND OBJECTIVE BOX
Subjective/Objective:  Patient is a 67y old  Male who presents with a chief complaint of staged PCI (2017 17:55)  S/P PCI: MARITZA x1 to OM1 via R radial.     Allergies    Honey (Vomiting)  No Known Drug Allergies    Intolerances      Medications:  aspirin enteric coated 81 milliGRAM(s) Oral daily  atorvastatin 40 milliGRAM(s) Oral at bedtime  clopidogrel Tablet 75 milliGRAM(s) Oral daily  cyanocobalamin 100 MICROGram(s) Oral daily  folic acid 1 milliGRAM(s) Oral daily  isosorbide   mononitrate ER Tablet (IMDUR) 60 milliGRAM(s) Oral daily  lisinopril 20 milliGRAM(s) Oral daily  LORazepam     Tablet 2 milliGRAM(s) Oral every 2 hours PRN  metoprolol succinate  milliGRAM(s) Oral daily  montelukast 10 milliGRAM(s) Oral daily  nicotine -   7 mG/24Hr(s) Patch 1 Patch Transdermal daily  pantoprazole    Tablet 40 milliGRAM(s) Oral before breakfast  sodium chloride 0.9%. 1000 milliLiter(s) IV Continuous <Continuous>  thiamine 100 milliGRAM(s) Oral daily      Review of Systems:   No c/o chest pain or SOB, and all others negative.    Vitals:  T(C): 36.7 (17 @ 05:15), Max: 36.9 (17 @ 21:30)  HR: 73 (17 @ 05:15) (72 - 82)  BP: 130/71 (17 @ 05:15) (119/108 - 165/97)  BP(mean): 99 (17 @ 08:59) (99 - 99)  RR: 17 (17 @ 05:15) (16 - 18)  SpO2: 97% (17 @ 05:15) (94% - 99%)  Wt(kg): --  Daily Height in cm: 177.8 (2017 08:59)    Daily Weight in k.3 (2017 08:59)  I&O's Summary    2017 07:01  -  2017 05:33  --------------------------------------------------------  IN: 800 mL / OUT: 0 mL / NET: 800 mL      Physical Exam:  Appearance: Pt in NAD, non-toxic  Cardiovascular: S1 S2  Cath Site: No evidence of bleeding or hematoma, Non-tender to palpation, 2+ distal pulses  Respiratory: Clear to auscultation bilaterally  Gastrointestinal: Soft, NT/ND, Bowel Sounds +  Neurologic: Non-focal                          11.5   11.1  )-----------( 271      ( 2017 02:43 )             36.5     11-30    141  |  103  |  16  ----------------------------<  196<H>  4.1   |  25  |  1.15    Ca    9.1      2017 02:43    TPro  8.1  /  Alb  4.4  /  TBili  0.6  /  DBili  x   /  AST  28  /  ALT  37  /  AlkPhos  109  11-29            Lipid panel   Hgb A1c     ECG: SR at HR 72, no significant changes from previous EKG.    Interpretation of Telemetry: SR at HR 70-80's.  No special event over night.    Assessment/Plan:   S/P PCI: MARITZA x1 to OM1 via R radial. Pt tolerated procedure well and overnight remained uneventful. No c/o chest pain or SOB. Pt is hemodynamically stable, EKG and all lab results reviewed. Insertion/incision site benign, no bleeding or hematoma, and cath site dressing changed. Discharge teaching provided to Pt/caretaker and verbalized understanding the instruction. Pt is stable for discharge home as per attending.   F/U with cardiologist in 1-2 weeks.  Plan to d/c home in Am if stable. Subjective/Objective:  Patient is a 67y old  Male who presents with a chief complaint of staged PCI (2017 17:55)  S/P PCI: MARITZA x1 to OM1 via R radial.     Allergies    Honey (Vomiting)  No Known Drug Allergies    Intolerances      Medications:  aspirin enteric coated 81 milliGRAM(s) Oral daily  atorvastatin 40 milliGRAM(s) Oral at bedtime  clopidogrel Tablet 75 milliGRAM(s) Oral daily  cyanocobalamin 100 MICROGram(s) Oral daily  folic acid 1 milliGRAM(s) Oral daily  isosorbide   mononitrate ER Tablet (IMDUR) 60 milliGRAM(s) Oral daily  lisinopril 20 milliGRAM(s) Oral daily  LORazepam     Tablet 2 milliGRAM(s) Oral every 2 hours PRN  metoprolol succinate  milliGRAM(s) Oral daily  montelukast 10 milliGRAM(s) Oral daily  nicotine -   7 mG/24Hr(s) Patch 1 Patch Transdermal daily  pantoprazole    Tablet 40 milliGRAM(s) Oral before breakfast  sodium chloride 0.9%. 1000 milliLiter(s) IV Continuous <Continuous>  thiamine 100 milliGRAM(s) Oral daily      Review of Systems:   No c/o chest pain or SOB, and all others negative.    Vitals:  T(C): 36.7 (17 @ 05:15), Max: 36.9 (17 @ 21:30)  HR: 73 (17 @ 05:15) (72 - 82)  BP: 130/71 (17 @ 05:15) (119/108 - 165/97)  BP(mean): 99 (17 @ 08:59) (99 - 99)  RR: 17 (17 @ 05:15) (16 - 18)  SpO2: 97% (17 @ 05:15) (94% - 99%)  Wt(kg): --  Daily Height in cm: 177.8 (2017 08:59)    Daily Weight in k.3 (2017 08:59)  I&O's Summary    2017 07:01  -  2017 05:33  --------------------------------------------------------  IN: 800 mL / OUT: 0 mL / NET: 800 mL      Physical Exam:  Appearance: Pt in NAD, non-toxic  Cardiovascular: S1 S2  Cath Site: No evidence of bleeding or hematoma, Non-tender to palpation, 2+ distal pulses  Respiratory: Clear to auscultation bilaterally  Gastrointestinal: Soft, NT/ND, Bowel Sounds +  Neurologic: Non-focal                          11.5   11.1  )-----------( 271      ( 2017 02:43 )             36.5     11-30    141  |  103  |  16  ----------------------------<  196<H>  4.1   |  25  |  1.15    Ca    9.1      2017 02:43    TPro  8.1  /  Alb  4.4  /  TBili  0.6  /  DBili  x   /  AST  28  /  ALT  37  /  AlkPhos  109  11-29            Lipid panel   Hgb A1c     ECG: SR at HR 72, no significant changes from previous EKG.    Interpretation of Telemetry: SR at HR 70-80's.  No special event over night.    Assessment/Plan:   S/P PCI: MARITZA x1 to OM1 via R radial. Pt tolerated procedure well and overnight remained uneventful. No c/o chest pain or SOB. Pt is hemodynamically stable, EKG and all lab results reviewed. noted Hgb down to 11.5 from 12.8, but no s/s of active bleeding. Pt will recheck H/H in 3 days with PCP. Insertion/incision site benign, no bleeding or hematoma, and cath site dressing changed. Discharge teaching provided to Pt/caretaker and verbalized understanding the instruction. Pt is stable for discharge home as per attending.   F/U with cardiologist in 1-2 weeks.  Plan to d/c home in Am if stable.

## 2017-12-05 RX ORDER — OMEPRAZOLE 10 MG/1
1 CAPSULE, DELAYED RELEASE ORAL
Qty: 0 | Refills: 0 | COMMUNITY

## 2017-12-05 RX ORDER — ATORVASTATIN CALCIUM 80 MG/1
1 TABLET, FILM COATED ORAL
Qty: 0 | Refills: 0 | COMMUNITY

## 2020-04-17 NOTE — PATIENT PROFILE ADULT. - PROVIDER NOTIFICATION PHONE
BUN/Cr 86/1.61 on admission, most likely 2/2 dehydration s/p NS IVF 500cc bolus in ED Cr improved to 1.45  -f/u UA and urine lytes  -Continue to monitor and avoid nephrotoxic agents RESOLVED   BUN/Cr 86/1.61 on admission, most likely 2/2 dehydration s/p NS IVF 500cc bolus in ED Cr improved to 1.45. Cr back to baseline 0.94  -f/u UA and urine lytes  -Continue to monitor and avoid nephrotoxic agents [phone]

## 2021-09-18 NOTE — PATIENT PROFILE ADULT. - PROVIDER NOTIFICATION NAME
Problem: Pain:  Goal: Pain level will decrease  Description: Pain level will decrease  Outcome: Ongoing  Goal: Control of acute pain  Description: Control of acute pain  Outcome: Ongoing  Goal: Control of chronic pain  Description: Control of chronic pain  Outcome: Ongoing     Problem: Falls - Risk of:  Goal: Will remain free from falls  Description: Will remain free from falls  Outcome: Ongoing  Goal: Absence of physical injury  Description: Absence of physical injury  Outcome: Ongoing     Problem: OXYGENATION/RESPIRATORY FUNCTION  Goal: Patient will maintain patent airway  Outcome: Ongoing  Goal: Patient will achieve/maintain normal respiratory rate/effort  Description: Respiratory rate and effort will be within normal limits for the patient  Outcome: Ongoing     Problem: HEMODYNAMIC STATUS  Goal: Patient has stable vital signs and fluid balance  Outcome: Ongoing     Problem: FLUID AND ELECTROLYTE IMBALANCE  Goal: Fluid and electrolyte balance are achieved/maintained  Outcome: Ongoing     Problem: ACTIVITY INTOLERANCE/IMPAIRED MOBILITY  Goal: Mobility/activity is maintained at optimum level for patient  Outcome: Ongoing Dr Silveira

## 2024-06-18 NOTE — DISCHARGE NOTE ADULT - NSCORESITESY/N_GEN_A_CORE_RD
Health Maintenance Due   Topic     TETANUS VACCINE  Pt will have to receive at pharmacy     COVID-19 Vaccine (3 - Moderna risk series) Not offered at this office    Eye Exam  Consult pcp          No

## 2024-07-10 NOTE — PATIENT PROFILE ADULT. - NS PRO PT REFERRAL QUES 2 YN
Vasiliy Corbin is a 89 year old male here for an INR check.  Feeling pretty well, no signs of bleeding.    There were no vitals taken for this visit.     Todays and previous results are:    Lab Results   Component Value Date    INR 3.9 07/10/2024    INR 2.6 06/06/2024    INR 2.2 05/15/2024    INR 1.8 05/01/2024    INR 1.8 04/04/2024     ASSESSMENT:                                                      Vasiliy was seen today for inr followup.    Diagnoses and all orders for this visit:    Long term current use of anticoagulant therapy  -     Prothrombin - INR ([facility])    Chronic atrial fibrillation (H)  -     Prothrombin - INR ([facility])    Had stopped salads  Will restart and skip today      PLAN:                                                    The Warfarin (Coumadin) dose see above     Recheck next INR in Follow up in 4 weeks.        Malvin Rosen MD  [facility]     no